# Patient Record
Sex: FEMALE | Race: WHITE | HISPANIC OR LATINO | Employment: UNEMPLOYED | URBAN - METROPOLITAN AREA
[De-identification: names, ages, dates, MRNs, and addresses within clinical notes are randomized per-mention and may not be internally consistent; named-entity substitution may affect disease eponyms.]

---
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
## 2019-02-07 ENCOUNTER — HOSPITAL ENCOUNTER (EMERGENCY)
Facility: HOSPITAL | Age: 19
Discharge: HOME | End: 2019-02-08
Attending: INTERNAL MEDICINE
Payer: MEDICAID

## 2019-02-07 DIAGNOSIS — R00.2 PALPITATIONS: ICD-10-CM

## 2019-02-07 DIAGNOSIS — R05.9 COUGH: ICD-10-CM

## 2019-02-07 DIAGNOSIS — B34.9 ACUTE VIRAL SYNDROME: Primary | ICD-10-CM

## 2019-02-07 LAB
ALBUMIN SERPL-MCNC: 4.1 G/DL (ref 3.3–5.5)
ALP SERPL-CCNC: 99 U/L (ref 42–141)
B-HCG UR QL: NEGATIVE
BILIRUB SERPL-MCNC: 0.8 MG/DL (ref 0.2–1.6)
BILIRUBIN, POC UA: NEGATIVE
BLOOD, POC UA: ABNORMAL
BUN SERPL-MCNC: 6 MG/DL (ref 7–22)
CALCIUM SERPL-MCNC: 9.1 MG/DL (ref 8–10.3)
CHLORIDE SERPL-SCNC: 102 MMOL/L (ref 98–108)
CLARITY, POC UA: CLEAR
COLOR, POC UA: YELLOW
CREAT SERPL-MCNC: 0.6 MG/DL (ref 0.6–1.2)
CTP QC/QA: YES
FLUAV AG NPH QL: NEGATIVE
FLUBV AG NPH QL: NEGATIVE
GLUCOSE SERPL-MCNC: 149 MG/DL (ref 73–118)
GLUCOSE, POC UA: NEGATIVE
KETONES, POC UA: NEGATIVE
LEUKOCYTE EST, POC UA: NEGATIVE
NITRITE, POC UA: NEGATIVE
PH UR STRIP: 6 [PH]
POC ALT (SGPT): 16 U/L (ref 10–47)
POC AST (SGOT): 24 U/L (ref 11–38)
POC TCO2: 24 MMOL/L (ref 18–33)
POTASSIUM BLD-SCNC: 3.2 MMOL/L (ref 3.6–5.1)
PROTEIN, POC UA: NEGATIVE
PROTEIN, POC: 7.5 G/DL (ref 6.4–8.1)
S PYO RRNA THROAT QL PROBE: NEGATIVE
SODIUM BLD-SCNC: 141 MMOL/L (ref 128–145)
SPECIFIC GRAVITY, POC UA: 1.01
UROBILINOGEN, POC UA: 0.2 E.U./DL

## 2019-02-07 PROCEDURE — 81003 URINALYSIS AUTO W/O SCOPE: CPT | Mod: ER

## 2019-02-07 PROCEDURE — 96360 HYDRATION IV INFUSION INIT: CPT | Mod: ER

## 2019-02-07 PROCEDURE — 87880 STREP A ASSAY W/OPTIC: CPT | Mod: ER

## 2019-02-07 PROCEDURE — 25000003 PHARM REV CODE 250: Mod: ER | Performed by: INTERNAL MEDICINE

## 2019-02-07 PROCEDURE — 93010 EKG 12-LEAD: ICD-10-PCS | Mod: ,,, | Performed by: INTERNAL MEDICINE

## 2019-02-07 PROCEDURE — 80053 COMPREHEN METABOLIC PANEL: CPT | Mod: ER

## 2019-02-07 PROCEDURE — 93010 ELECTROCARDIOGRAM REPORT: CPT | Mod: ,,, | Performed by: INTERNAL MEDICINE

## 2019-02-07 PROCEDURE — 87081 CULTURE SCREEN ONLY: CPT

## 2019-02-07 PROCEDURE — 96361 HYDRATE IV INFUSION ADD-ON: CPT | Mod: ER

## 2019-02-07 PROCEDURE — 81025 URINE PREGNANCY TEST: CPT | Mod: ER | Performed by: INTERNAL MEDICINE

## 2019-02-07 PROCEDURE — 85025 COMPLETE CBC W/AUTO DIFF WBC: CPT | Mod: ER

## 2019-02-07 PROCEDURE — 99285 EMERGENCY DEPT VISIT HI MDM: CPT | Mod: 25,ER

## 2019-02-07 PROCEDURE — 87804 INFLUENZA ASSAY W/OPTIC: CPT | Mod: ER

## 2019-02-07 PROCEDURE — 93005 ELECTROCARDIOGRAM TRACING: CPT | Mod: ER

## 2019-02-07 RX ORDER — IBUPROFEN 800 MG/1
800 TABLET ORAL EVERY 8 HOURS PRN
Qty: 30 TABLET | Refills: 0 | Status: SHIPPED | OUTPATIENT
Start: 2019-02-07 | End: 2019-08-14

## 2019-02-07 RX ORDER — AZELASTINE 1 MG/ML
2 SPRAY, METERED NASAL 2 TIMES DAILY
Qty: 30 ML | Refills: 0 | Status: SHIPPED | OUTPATIENT
Start: 2019-02-07 | End: 2019-08-14

## 2019-02-07 RX ORDER — IBUPROFEN 600 MG/1
600 TABLET ORAL
Status: COMPLETED | OUTPATIENT
Start: 2019-02-07 | End: 2019-02-07

## 2019-02-07 RX ORDER — FLUTICASONE PROPIONATE 50 MCG
2 SPRAY, SUSPENSION (ML) NASAL DAILY
Qty: 15 G | Refills: 0 | Status: SHIPPED | OUTPATIENT
Start: 2019-02-07 | End: 2019-08-14

## 2019-02-07 RX ORDER — ACETAMINOPHEN 325 MG/1
650 TABLET ORAL
Status: COMPLETED | OUTPATIENT
Start: 2019-02-07 | End: 2019-02-07

## 2019-02-07 RX ORDER — SODIUM CHLORIDE 9 MG/ML
1000 INJECTION, SOLUTION INTRAVENOUS
Status: COMPLETED | OUTPATIENT
Start: 2019-02-07 | End: 2019-02-08

## 2019-02-07 RX ORDER — SODIUM CHLORIDE 9 MG/ML
1000 INJECTION, SOLUTION INTRAVENOUS ONCE
Status: COMPLETED | OUTPATIENT
Start: 2019-02-07 | End: 2019-02-07

## 2019-02-07 RX ADMIN — ACETAMINOPHEN 650 MG: 325 TABLET, FILM COATED ORAL at 09:02

## 2019-02-07 RX ADMIN — SODIUM CHLORIDE 1000 ML: 0.9 INJECTION, SOLUTION INTRAVENOUS at 09:02

## 2019-02-07 RX ADMIN — IBUPROFEN 600 MG: 600 TABLET ORAL at 11:02

## 2019-02-07 RX ADMIN — SODIUM CHLORIDE 1000 ML: 0.9 INJECTION, SOLUTION INTRAVENOUS at 11:02

## 2019-02-08 VITALS
DIASTOLIC BLOOD PRESSURE: 69 MMHG | RESPIRATION RATE: 20 BRPM | HEIGHT: 60 IN | WEIGHT: 130 LBS | HEART RATE: 104 BPM | BODY MASS INDEX: 25.52 KG/M2 | TEMPERATURE: 99 F | SYSTOLIC BLOOD PRESSURE: 108 MMHG | OXYGEN SATURATION: 98 %

## 2019-02-08 NOTE — ED NOTES
PT AND MOTHER UPDATED ON PLAN OF CARE FOR LABS AND IVF'S. UPDATED ON APPROXIMATE TIME FOR RESULTS AND RE-EVALUATION. BOTH VERBALIZE UNDERSTANDING AND DENY AND NEEDS AT PRESENT TIME. CALL WEEMS IN REACH

## 2019-02-08 NOTE — ED NOTES
PT AND MOTHER UPDATED ON PLAN OF CARE FOR MOTRIN AND ANOTHER LITER OF FLUID, BOTH VERBALIZE UNDERSTANDING AND DENY ANY NEEDS OR CONCERNS AT PRESENT TIME

## 2019-02-08 NOTE — ED PROVIDER NOTES
Encounter Date: 2/7/2019    SCRIBE #1 NOTE: I, Gil Williamson, am scribing for, and in the presence of,  Dr. Hilton . I have scribed the following portions of the note - Other sections scribed: HPI, ROS, PE.       History     Chief Complaint   Patient presents with    Fever     Fever, chills, cough all night, generlized aches, and weakness. Also, complaints of sore throat onset last night.     This is a 18 y.o. female who presents to the ED with a complaint of palpitations that began at 10 a.m today. Nothing worsens or relieves her palpitations. Pt endorses fever, chills, fatigue, sore throat, coughing, and myalgias that began earlier this morning. She has not taken medication for her symptoms.  Pt denies abdominal pain.      The history is provided by the patient.     Review of patient's allergies indicates:   Allergen Reactions    Kiwi (actinidia chinensis)      Past Medical History:   Diagnosis Date    Anxiety     Asthma     Depression     Depression      History reviewed. No pertinent surgical history.  Family History   Problem Relation Age of Onset    Hypertension Father     Congenital heart disease Cousin     Cardiomyopathy Neg Hx     Early death Neg Hx     Heart attacks under age 50 Neg Hx     Pacemaker/defibrilator Neg Hx      Social History     Tobacco Use    Smoking status: Never Smoker    Smokeless tobacco: Never Used   Substance Use Topics    Alcohol use: No    Drug use: No     Review of Systems   Constitutional: Positive for chills.   HENT: Positive for sore throat.    Cardiovascular: Positive for palpitations.   Gastrointestinal: Negative for abdominal pain.   All other systems reviewed and are negative.      Physical Exam     Initial Vitals [02/07/19 2005]   BP Pulse Resp Temp SpO2   117/84 (!) 148 18 (!) 101.2 °F (38.4 °C) 98 %      MAP       --         Physical Exam    Nursing note and vitals reviewed.  Constitutional: She appears well-developed and well-nourished.   HENT:   Head:  Normocephalic and atraumatic.   Right Ear: External ear normal.   Left Ear: External ear normal.   Nose: Mucosal edema and rhinorrhea (Clear) present.   Mouth/Throat: Uvula is midline and mucous membranes are normal. Posterior oropharyngeal erythema present. No oropharyngeal exudate or posterior oropharyngeal edema.   Eyes: Conjunctivae are normal.   Neck: Normal range of motion. Neck supple.   Cardiovascular: Regular rhythm and intact distal pulses. Tachycardia present.  Exam reveals no gallop and no friction rub.    No murmur heard.  Pulmonary/Chest: Effort normal and breath sounds normal. No respiratory distress. She has no wheezes. She has no rhonchi. She has no rales. She exhibits no tenderness.   Musculoskeletal: Normal range of motion.   Neurological: She is alert and oriented to person, place, and time.   Skin: Skin is warm and dry.   Psychiatric: She has a normal mood and affect.         ED Course   Procedures  Labs Reviewed   POCT URINALYSIS W/O SCOPE - Abnormal; Notable for the following components:       Result Value    Glucose, UA Negative (*)     Bilirubin, UA Negative (*)     Ketones, UA Negative (*)     Blood, UA Trace-lysed (*)     Protein, UA Negative (*)     Nitrite, UA Negative (*)     Leukocytes, UA Negative (*)     All other components within normal limits   POCT CMP - Abnormal; Notable for the following components:    POC BUN 6 (*)     POC Glucose 149 (*)     POC Potassium 3.2 (*)     All other components within normal limits   CULTURE, STREP A,  THROAT   POCT URINE PREGNANCY   POCT INFLUENZA A/B   POCT RAPID STREP A   POCT URINALYSIS W/O SCOPE   POCT CBC   POCT CMP     EKG Readings: (Independently Interpreted)   Initial Reading: No STEMI. Rhythm: Sinus Tachycardia. Heart Rate: 129. ST Segments: Normal ST Segments.       Imaging Results          X-Ray Chest PA And Lateral (Final result)  Result time 02/08/19 01:12:33    Final result by Goldie Eugene MD (02/08/19 01:12:33)                  Impression:      No acute abnormality.      Electronically signed by: Goldie Jabier  Date:    02/08/2019  Time:    01:12             Narrative:    EXAMINATION:  XR CHEST PA AND LATERAL    CLINICAL HISTORY:  Cough    TECHNIQUE:  PA and lateral views of the chest were performed.    COMPARISON:  None    FINDINGS:  The lungs are clear, with normal appearance of pulmonary vasculature and no pleural effusion or pneumothorax.    The cardiac silhouette is normal in size. The hilar and mediastinal contours are unremarkable.    Bones are intact.                                 Medical Decision Making:   Initial Assessment:   This is a 18 y.o. female who presents to the ED with a complaint of palpitations that began at 10 a.m. Nothing worsens or relieves her palpitations. Pt endorses fever, chills, fatigue, sore throat, coughing, and myalgias that began earlier this morning. She has not taken medication for her symptoms.  Pt denies abdominal pain.   Clinical Tests:   Lab Tests: Ordered  Medical Tests: Ordered  ED Management:  Instructions for acute URI were given and patient was advised to follow up with her primary care physician tomorrow for re-evaluation and to return to the emergency department if condition worsens.            Scribe Attestation:   Scribe #1: I performed the above scribed service and the documentation accurately describes the services I performed. I attest to the accuracy of the note.     This document was produced by a scribe under my direction and in my presence. I agree with the content of the note and have made any necessary edits.     Dr. Hilton    02/11/2019 3:18 AM             Clinical Impression:     1. Acute viral syndrome    2. Palpitations    3. Cough           Disposition:   Disposition: Discharged  Condition: Stable                        Orville Hilton MD  02/11/19 0320

## 2019-02-08 NOTE — ED NOTES
Rounding on the patient has been done.   she has been updated on the plan of care and her current status.  Necessary items were placed with in her reach and she was advised when a reassessment would take place.   The call bell remains at the bedside for any additional patient needs.   The patient reports coughing more and feels slightly sob  Airway, Breathing, Circulation intact.   Will continue to monitor.

## 2019-02-08 NOTE — ED NOTES
Orthostatics Vital Signs:  Lying:    Blood Pressure: 95 43   Heart Rate: 124   Sitting:    Blood Pressure: 88 41   Heart Rate: 139  Standing:   Blood Pressure: 96 57   Heart Rate: 156

## 2019-02-08 NOTE — ED NOTES
DR REESE NOTIFIED OF PTS NEW COMPLAINTS OF SOB, NEW ORDERS NOTED FOR XRAY, PT AND MOTHER UPDATED WITH NEW PLAN OF CARE AND APPROXIMATE TIME FOR RESULTS AND RE-EVAL, BOTH VERBALIZE UNDERSTANDING

## 2019-02-09 LAB — BACTERIA THROAT CULT: NORMAL

## 2019-08-14 ENCOUNTER — OFFICE VISIT (OUTPATIENT)
Dept: OBSTETRICS AND GYNECOLOGY | Facility: CLINIC | Age: 19
End: 2019-08-14
Payer: MEDICAID

## 2019-08-14 VITALS
WEIGHT: 127.13 LBS | SYSTOLIC BLOOD PRESSURE: 100 MMHG | BODY MASS INDEX: 24.96 KG/M2 | DIASTOLIC BLOOD PRESSURE: 72 MMHG | HEIGHT: 60 IN

## 2019-08-14 DIAGNOSIS — L70.9 ACNE, UNSPECIFIED ACNE TYPE: ICD-10-CM

## 2019-08-14 DIAGNOSIS — N94.6 DYSMENORRHEA: Primary | ICD-10-CM

## 2019-08-14 PROCEDURE — 99999 PR PBB SHADOW E&M-EST. PATIENT-LVL III: ICD-10-PCS | Mod: PBBFAC,,, | Performed by: OBSTETRICS & GYNECOLOGY

## 2019-08-14 PROCEDURE — 99385 PR PREVENTIVE VISIT,NEW,18-39: ICD-10-PCS | Mod: S$PBB,,, | Performed by: OBSTETRICS & GYNECOLOGY

## 2019-08-14 PROCEDURE — 99385 PREV VISIT NEW AGE 18-39: CPT | Mod: S$PBB,,, | Performed by: OBSTETRICS & GYNECOLOGY

## 2019-08-14 PROCEDURE — 99999 PR PBB SHADOW E&M-EST. PATIENT-LVL III: CPT | Mod: PBBFAC,,, | Performed by: OBSTETRICS & GYNECOLOGY

## 2019-08-14 PROCEDURE — 99213 OFFICE O/P EST LOW 20 MIN: CPT | Mod: PBBFAC | Performed by: OBSTETRICS & GYNECOLOGY

## 2019-08-14 RX ORDER — DROSPIRENONE AND ETHINYL ESTRADIOL 0.02-3(28)
1 KIT ORAL DAILY
Qty: 30 TABLET | Refills: 11 | Status: SHIPPED | OUTPATIENT
Start: 2019-08-14 | End: 2020-08-13

## 2019-08-14 NOTE — PROGRESS NOTES
Subjective:      Chief Complaint:    Chief Complaint   Patient presents with    Gynecologic Exam    Menstrual Problem       Menstrual History:    OB History    None         Menarche age: 12     No LMP recorded.                Objective:    HISTORY OF PRESENT ILLNESS:  The patient is 18 years of age.  Nulligravida, new   patient to the clinic, new patient to me.  The patient's medical history is   acne.  No surgical history.    The patient has a menstrual cycle, menarche at age 11, regular cycles without   problem except dysmenorrhea, which is getting progressively worse.  Apparently,   the patient used birth control pill in the past with some help.  The patient   also has acne.  Also, states that the painful crampy period is getting worse.    The patient stated not sexually active, had no exam before.  Menstrual cycles   lasting about four to six days, uses pads.  So, exam was not performed.    PLAN:  We will put the patient on Shannon.  Also, we will do a transabdominal   ultrasound to evaluate the internal organs to make sure that we are not dealing   with some genital problems or abnormalities.  Then we will see the patient back   in three months.  If the dysmenorrhea improves, then we will continue with the   birth control pill.  If, however, not, then we may have to rule out the   possibility of endometriosis.      GAB/CORIN  dd: 08/14/2019 09:17:04 (CDT)  td: 08/15/2019 03:28:10 (CDT)  Doc ID   #[id number]  Job ID #[id number]    CC:         History of Present Illness AND  Examination detailed DICTATE:        Physical Exam   Constitutional: She is oriented to person, place, and time. She appears well-developed and well-nourished. No distress.   HENT:   Head: Normocepha.  Eyes: Pupils are equal, round, and reactive to light.   Neck: Neck supp   Cardiovascular: Normal rate, regular rhythm and normal heart sounds. No murmur heard.  Pulmonary/Chest: Effort normal and breath sounds normal. No respiratory distress. She has no  wheezes. She has no rales. .   Abdominal: Bowel sounds are normal. She exhibits no distension and no mass. There is no tenderness. There is no rebound and no guarding..   Musculoskeletal: Normal range of motion.   Lymphadenopathy:        Right: No inguinal adenopathy present.        Left: No inguinal adenopathy present.   Neurological: She is alert and oriented.  Skin: Skin is warm. No rash noted.        Review of Systems  Review of Systems   Normal ROS:   Constitutional: Negative for fever, chills, activity change fatigue and unexpected weight change.   HENT: Negative for nosebleeds, congestion.  Eyes: Negative for visual disturbance.   Respiratory: Negative for shortness of breath and wheezing.    Cardiovascular: Negative for chest pain, palpitations.   Gastrointestinal: Negative for abdominal pain, diarrhea, constipation, blood in stool and abdominal distention.   Musculoskeletal: Negative for back pain.   Allergic/Immunologic: Negative    Neurological: Negative .   Hematological: Negative   Psychiatric/Behavioral: DEPRESSION    Assessment:      DiagnosisDYSMENORRHEA   ACNE:       Plan:      Return in 3  months

## 2019-08-14 NOTE — PATIENT INSTRUCTIONS

## 2019-08-28 ENCOUNTER — HOSPITAL ENCOUNTER (OUTPATIENT)
Dept: RADIOLOGY | Facility: HOSPITAL | Age: 19
Discharge: HOME | End: 2019-08-28
Attending: OBSTETRICS & GYNECOLOGY
Payer: MEDICAID

## 2019-08-28 DIAGNOSIS — N94.6 DYSMENORRHEA: ICD-10-CM

## 2019-08-28 PROCEDURE — 76857 US PELIVS LIMITED NON OB: ICD-10-PCS | Mod: 26,,, | Performed by: RADIOLOGY

## 2019-08-28 PROCEDURE — 76857 US EXAM PELVIC LIMITED: CPT | Mod: TC

## 2019-08-28 PROCEDURE — 76857 US EXAM PELVIC LIMITED: CPT | Mod: 26,,, | Performed by: RADIOLOGY

## 2019-08-29 ENCOUNTER — TELEPHONE (OUTPATIENT)
Dept: OBSTETRICS AND GYNECOLOGY | Facility: CLINIC | Age: 19
End: 2019-08-29

## 2019-08-29 NOTE — TELEPHONE ENCOUNTER
----- Message from Benito Sahu MD sent at 8/29/2019  7:50 AM CDT -----  Your results are normal.  U\s   pelvis

## 2019-09-11 ENCOUNTER — TELEPHONE (OUTPATIENT)
Dept: OBSTETRICS AND GYNECOLOGY | Facility: CLINIC | Age: 19
End: 2019-09-11

## 2019-09-11 NOTE — TELEPHONE ENCOUNTER
----- Message from Peri Conteh sent at 9/11/2019  3:09 PM CDT -----  Contact: Meenakshi-Mother  Type:  Test Results    Who Called: Erendira    Name of Test (Lab/Mammo/Etc): u/s    Date of Test: last week of August    Ordering Provider: Luis Alberto    Where the test was performed: [facility]      Would the patient rather a call back or a response via [facility]? Call    Best Call Back Number: [phone]    Additional Information:  Mother called to discuss results. Please call to advise

## 2019-09-16 ENCOUNTER — TELEPHONE (OUTPATIENT)
Dept: OBSTETRICS AND GYNECOLOGY | Facility: CLINIC | Age: 19
End: 2019-09-16

## 2019-09-16 NOTE — TELEPHONE ENCOUNTER
----- Message from Neftali Ruiz sent at 9/16/2019  8:34 AM CDT -----  Contact: Mother- Samina  Type:  Test Results    Who Called: Mother    Name of Test (Lab/Mammo/Etc): Ultrasound    Date of Test: 8/28/2019    Ordering Provider: Dr. LASHA Sahu    Where the test was performed: [facility]    Would the patient rather a call back or a response via [facility]? Callback    Best Call Back Number: [phone]

## 2020-07-14 ENCOUNTER — TELEPHONE (OUTPATIENT)
Dept: PODIATRY | Facility: CLINIC | Age: 20
End: 2020-07-14

## 2020-07-14 NOTE — TELEPHONE ENCOUNTER
----- Message from Goldie Cruz DPM sent at 7/14/2020  7:52 AM CDT -----  Contact: Sylwia mother / [phone]    ----- Message -----  From: Junaid López  Sent: 7/13/2020   4:41 PM CDT  To: Anthony Amezcua Staff    The patient's mother would like to speak with your office regarding the patient establishing care, pt has medicaid. Please Advise.

## 2020-07-15 ENCOUNTER — TELEPHONE (OUTPATIENT)
Dept: PODIATRY | Facility: CLINIC | Age: 20
End: 2020-07-15

## 2020-07-15 NOTE — TELEPHONE ENCOUNTER
----- Message from Meka Reddy sent at 7/15/2020  9:31 AM CDT -----  Regarding: sooner appt  Type:  Sooner Appointment Request    Patient is requesting a sooner appointment.  Patient declined first available appointment listed as well as another facility and provider .  Patient will not accept being placed on the waitlist and is requesting a message be sent to doctor.    Name of Caller: Amy     When is the first available appointment? 09/15    Symptoms: twisted foot/ ankle     Would the patient rather a call back or a response via [facility]? Call back     Best Call Back Number: [phone] or [phone]    Additional Information: The patient would like her appt moved up. She stated that she can barely walk on her foot and is in pain.

## 2020-09-14 ENCOUNTER — TELEPHONE (OUTPATIENT)
Dept: PODIATRY | Facility: CLINIC | Age: 20
End: 2020-09-14

## 2020-09-14 NOTE — TELEPHONE ENCOUNTER
----- Message from Meka Reddy sent at 9/14/2020  9:49 AM CDT -----  Regarding: reschedule appt  Type: Patient Call Back    Who called: Anup (mother)    What is the request in detail: Anup, mother of the patient is requesting to reschedule the patient's appt due to the weather. The next availability was 12/08. She does not want to wait that long and would like the patient to be seen sooner.    Can the clinic reply by [facility]?no    Would the patient rather a call back or a response via [facility]? Call back     Best call back number:[phone]

## 2021-09-22 ENCOUNTER — OFFICE VISIT (OUTPATIENT)
Dept: OBSTETRICS AND GYNECOLOGY | Facility: CLINIC | Age: 21
End: 2021-09-22
Payer: COMMERCIAL

## 2021-09-22 VITALS
HEIGHT: 61 IN | DIASTOLIC BLOOD PRESSURE: 78 MMHG | SYSTOLIC BLOOD PRESSURE: 102 MMHG | WEIGHT: 140 LBS | BODY MASS INDEX: 26.43 KG/M2

## 2021-09-22 DIAGNOSIS — R10.9 ABDOMINAL PAIN, UNSPECIFIED ABDOMINAL LOCATION: ICD-10-CM

## 2021-09-22 DIAGNOSIS — Z30.432 ENCOUNTER FOR IUD REMOVAL: ICD-10-CM

## 2021-09-22 DIAGNOSIS — N73.0 PID (ACUTE PELVIC INFLAMMATORY DISEASE): Primary | ICD-10-CM

## 2021-09-22 LAB
B-HCG UR QL: NEGATIVE
CTP QC/QA: YES

## 2021-09-22 PROCEDURE — 99215 OFFICE O/P EST HI 40 MIN: CPT | Mod: 25,S$GLB,, | Performed by: OBSTETRICS & GYNECOLOGY

## 2021-09-22 PROCEDURE — 81025 POCT URINE PREGNANCY: ICD-10-PCS | Mod: S$GLB,,, | Performed by: OBSTETRICS & GYNECOLOGY

## 2021-09-22 PROCEDURE — 81025 URINE PREGNANCY TEST: CPT | Mod: S$GLB,,, | Performed by: OBSTETRICS & GYNECOLOGY

## 2021-09-22 PROCEDURE — 99215 PR OFFICE/OUTPT VISIT, EST, LEVL V, 40-54 MIN: ICD-10-PCS | Mod: 25,S$GLB,, | Performed by: OBSTETRICS & GYNECOLOGY

## 2021-09-22 RX ORDER — METRONIDAZOLE 500 MG/1
500 TABLET ORAL 2 TIMES DAILY
Qty: 14 TABLET | Refills: 0 | Status: SHIPPED | OUTPATIENT
Start: 2021-09-22 | End: 2021-09-29

## 2021-09-22 RX ORDER — ONDANSETRON 4 MG/1
4 TABLET, FILM COATED ORAL EVERY 6 HOURS PRN
Qty: 30 TABLET | Refills: 1 | Status: SHIPPED | OUTPATIENT
Start: 2021-09-22 | End: 2022-09-22

## 2025-03-19 ENCOUNTER — TELEPHONE (OUTPATIENT)
Dept: FAMILY MEDICINE | Facility: CLINIC | Age: 25
End: 2025-03-19
Payer: COMMERCIAL

## 2025-03-19 NOTE — TELEPHONE ENCOUNTER
----- Message from Sydney sent at 3/19/2025  2:37 PM CDT -----  Contact: self  Type:  Appointment RequestCaller is requesting a appointment. Name of Caller:pt Symptoms:Would the patient rather a call back or a response via [facility]? [facility] Call Back Number:[phone] Information: pt needs an appt   Please call back to advise. Thanks!

## 2025-03-25 ENCOUNTER — RESULTS FOLLOW-UP (OUTPATIENT)
Dept: FAMILY MEDICINE | Facility: CLINIC | Age: 25
End: 2025-03-25

## 2025-03-25 ENCOUNTER — OFFICE VISIT (OUTPATIENT)
Dept: FAMILY MEDICINE | Facility: CLINIC | Age: 25
End: 2025-03-25
Payer: COMMERCIAL

## 2025-03-25 VITALS
DIASTOLIC BLOOD PRESSURE: 82 MMHG | SYSTOLIC BLOOD PRESSURE: 108 MMHG | BODY MASS INDEX: 29.15 KG/M2 | OXYGEN SATURATION: 98 % | WEIGHT: 154.38 LBS | HEART RATE: 96 BPM | HEIGHT: 61 IN

## 2025-03-25 DIAGNOSIS — R73.9 HYPERGLYCEMIA: ICD-10-CM

## 2025-03-25 DIAGNOSIS — Z13.220 SCREENING FOR LIPID DISORDERS: ICD-10-CM
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
## 2025-03-25 DIAGNOSIS — R55 VASOVAGAL SYNCOPE: ICD-10-CM

## 2025-03-25 DIAGNOSIS — Z79.899 HIGH RISK MEDICATION USE: ICD-10-CM

## 2025-03-25 DIAGNOSIS — R53.83 FATIGUE, UNSPECIFIED TYPE: Primary | ICD-10-CM

## 2025-03-25 PROBLEM — B34.9 ACUTE VIRAL SYNDROME: Status: RESOLVED | Noted: 2019-02-07 | Resolved: 2025-03-25

## 2025-03-25 PROBLEM — R00.2 PALPITATIONS: Status: RESOLVED | Noted: 2019-02-07 | Resolved: 2025-03-25

## 2025-03-25 PROCEDURE — 99204 OFFICE O/P NEW MOD 45 MIN: CPT | Mod: S$GLB,,, | Performed by: STUDENT IN AN ORGANIZED HEALTH CARE EDUCATION/TRAINING PROGRAM

## 2025-03-25 PROCEDURE — 1160F RVW MEDS BY RX/DR IN RCRD: CPT | Mod: CPTII,S$GLB,, | Performed by: STUDENT IN AN ORGANIZED HEALTH CARE EDUCATION/TRAINING PROGRAM

## 2025-03-25 PROCEDURE — 84075 ASSAY ALKALINE PHOSPHATASE: CPT | Performed by: STUDENT IN AN ORGANIZED HEALTH CARE EDUCATION/TRAINING PROGRAM

## 2025-03-25 PROCEDURE — 3079F DIAST BP 80-89 MM HG: CPT | Mod: CPTII,S$GLB,, | Performed by: STUDENT IN AN ORGANIZED HEALTH CARE EDUCATION/TRAINING PROGRAM

## 2025-03-25 PROCEDURE — G2211 COMPLEX E/M VISIT ADD ON: HCPCS | Mod: S$GLB,,, | Performed by: STUDENT IN AN ORGANIZED HEALTH CARE EDUCATION/TRAINING PROGRAM

## 2025-03-25 PROCEDURE — 82607 VITAMIN B-12: CPT | Performed by: STUDENT IN AN ORGANIZED HEALTH CARE EDUCATION/TRAINING PROGRAM

## 2025-03-25 PROCEDURE — 83036 HEMOGLOBIN GLYCOSYLATED A1C: CPT | Performed by: STUDENT IN AN ORGANIZED HEALTH CARE EDUCATION/TRAINING PROGRAM

## 2025-03-25 PROCEDURE — 85025 COMPLETE CBC W/AUTO DIFF WBC: CPT | Performed by: STUDENT IN AN ORGANIZED HEALTH CARE EDUCATION/TRAINING PROGRAM

## 2025-03-25 PROCEDURE — 3074F SYST BP LT 130 MM HG: CPT | Mod: CPTII,S$GLB,, | Performed by: STUDENT IN AN ORGANIZED HEALTH CARE EDUCATION/TRAINING PROGRAM

## 2025-03-25 PROCEDURE — 84443 ASSAY THYROID STIM HORMONE: CPT | Performed by: STUDENT IN AN ORGANIZED HEALTH CARE EDUCATION/TRAINING PROGRAM

## 2025-03-25 PROCEDURE — 3008F BODY MASS INDEX DOCD: CPT | Mod: CPTII,S$GLB,, | Performed by: STUDENT IN AN ORGANIZED HEALTH CARE EDUCATION/TRAINING PROGRAM

## 2025-03-25 PROCEDURE — 82728 ASSAY OF FERRITIN: CPT | Performed by: STUDENT IN AN ORGANIZED HEALTH CARE EDUCATION/TRAINING PROGRAM

## 2025-03-25 PROCEDURE — 1159F MED LIST DOCD IN RCRD: CPT | Mod: CPTII,S$GLB,, | Performed by: STUDENT IN AN ORGANIZED HEALTH CARE EDUCATION/TRAINING PROGRAM

## 2025-03-25 PROCEDURE — 84466 ASSAY OF TRANSFERRIN: CPT | Performed by: STUDENT IN AN ORGANIZED HEALTH CARE EDUCATION/TRAINING PROGRAM

## 2025-03-25 PROCEDURE — 82465 ASSAY BLD/SERUM CHOLESTEROL: CPT | Performed by: STUDENT IN AN ORGANIZED HEALTH CARE EDUCATION/TRAINING PROGRAM
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
## 2025-03-26 ENCOUNTER — TELEPHONE (OUTPATIENT)
Dept: FAMILY MEDICINE | Facility: CLINIC | Age: 25
End: 2025-03-26
Payer: COMMERCIAL

## 2025-03-26 NOTE — TELEPHONE ENCOUNTER
----- Message from Etta Azevedo DO sent at 3/25/2025  5:07 PM CDT -----  Please contact the patient and let her know that her lab work has resulted.  Cholesterol panel without acute findings.  CMP without electrolyte, kidney, or liver dysfunction.  Iron studies within   normal limits.  Thyroid function within normal limits.  A1c within normal limits.  No diabetes.  CBC without anemia or other acute findings.  ----- Message -----  From: Lab, Background User  Sent: 3/25/2025   2:09 PM CDT  To: Etta Azevedo DO

## 2025-04-25 ENCOUNTER — OFFICE VISIT (OUTPATIENT)
Dept: FAMILY MEDICINE | Facility: CLINIC | Age: 25
End: 2025-04-25
Payer: COMMERCIAL

## 2025-04-25 VITALS
BODY MASS INDEX: 29.38 KG/M2 | SYSTOLIC BLOOD PRESSURE: 100 MMHG | OXYGEN SATURATION: 99 % | DIASTOLIC BLOOD PRESSURE: 70 MMHG | HEART RATE: 81 BPM | WEIGHT: 155.63 LBS | HEIGHT: 61 IN

## 2025-04-25 DIAGNOSIS — Z12.4 CERVICAL CANCER SCREENING: Primary | ICD-10-CM

## 2025-04-25 NOTE — PATIENT INSTRUCTIONS
Mian Reyes,     If you are due for any health screening(s) below please notify me so we can arrange them to be ordered and scheduled. Most healthy patients at your age complete them, but you are free to accept or refuse.     If you can't do it, I'll definitely understand. If you can, I'd certainly appreciate it!    Tests to Keep You Healthy    Cervical Cancer Screening: ORDERED

## 2025-04-25 NOTE — PROGRESS NOTES
[facility]  Primary Care Clinic - [place], [place]    Family Medicine Office Visit    Subjective     Patient ID: Amy Mcleod is a 24 y.o. female who presents to clinic today to follow up.     Medical history, surgical history, medications, allergies, and social history were reviewed and updated.     Chief Complaint: Gynecologic Exam    Gynecologic Exam        Presents today to complete cervical cancer screening.  No abnormal bleeding or vaginal discharge.  No history of cervical cancer or abnormal findings on previous Paps.    Vitals:    04/25/25 1338   BP: 100/70   Pulse: 81      Wt Readings from Last 3 Encounters:   04/25/25 1338 70.6 kg (155 lb 9.6 oz)   03/25/25 1012 70 kg (154 lb 6.4 oz)   09/22/21 1423 63.5 kg (140 lb)      Review of Systems    Review of Systems otherwise negative unless specified above.        Objective     Physical Exam  Vitals reviewed. Exam conducted with a chaperone present.   Constitutional:       General: She is not in acute distress.     Appearance: Normal appearance.   HENT:      Head: Normocephalic and atraumatic.      Nose: Nose normal.      Mouth/Throat:      Mouth: Mucous membranes are moist.   Cardiovascular:      Rate and Rhythm: Normal rate and regular rhythm.      Heart sounds: No murmur heard.  Pulmonary:      Effort: Pulmonary effort is normal. No respiratory distress.      Breath sounds: Normal breath sounds.   Chest:   Breasts:     Right: No mass, nipple discharge, skin change or tenderness.      Left: No mass, nipple discharge, skin change or tenderness.   Musculoskeletal:         General: Normal range of motion.   Skin:     General: Skin is warm and dry.   Neurological:      General: No focal deficit present.      Mental Status: She is alert and oriented to person, place, and time.   Psychiatric:         Mood and Affect: Mood normal.         Behavior: Behavior normal.     Pelvic exam: normal external genitalia, vulva, vagina, cervix, uterus and adnexa, VAGINA:  normal appearing vagina with normal color and discharge, no lesions, CERVIX: normal appearing cervix without discharge or lesions, UTERUS: uterus is normal size, shape, consistency and nontender, PAP: Pap smear done today, exam chaperoned by Monica Perez MA.         Assessment and Plan     No current outpatient medications     1. Cervical cancer screening  -     Cancel: Liquid-Based Pap Smear, Screening  -     Liquid-Based Pap Smear, Screening        We will notify patient of Pap smear results when available.  We will otherwise plan to have her follow up in 1 year sooner if needed.    Visit today included increased complexity associated with the care of the episodic problem cervical cancer screening addressed and managing the longitudinal care of the patient due to the serious and/or complex managed problem(s) cervical cancer screening.         Follow up in about 1 year (around 4/25/2026) for Follow up with Jolly.    Questions were invited and answered. No other acute concerns at this time. Will plan to follow up as above or sooner if needed.     Etta Azevedo,   04/25/2025 1:44 PM       This dictation has been generated using Modal Fluency Dictation. Some phonetic errors may occur. Please contact author for clarification if needed.

## 2025-04-30 ENCOUNTER — RESULTS FOLLOW-UP (OUTPATIENT)
Dept: FAMILY MEDICINE | Facility: CLINIC | Age: 25
End: 2025-04-30

## 2025-04-30 ENCOUNTER — TELEPHONE (OUTPATIENT)
Dept: FAMILY MEDICINE | Facility: CLINIC | Age: 25
End: 2025-04-30
Payer: COMMERCIAL

## 2025-04-30 NOTE — TELEPHONE ENCOUNTER
----- Message from Luisito sent at 4/30/2025 10:12 AM CDT -----  Contact: Pt [phone]  Type:  Patient Returning CallWho Called:PtWho Left Message for Patient:Arnaldo the patient know what this is regarding?:Test resultsWould the patient rather a call back or a response via [facility]? [facility] Back Number:[phone] Additional Information: Pt returning a call. Thank you.

## 2025-09-03 ENCOUNTER — TELEPHONE (OUTPATIENT)
Dept: FAMILY MEDICINE | Facility: CLINIC | Age: 25
End: 2025-09-03
Payer: COMMERCIAL